# Patient Record
(demographics unavailable — no encounter records)

---

## 2025-03-03 NOTE — CONSULT LETTER
[Dear  ___] : Dear  [unfilled], [Consult Letter:] : I had the pleasure of evaluating your patient, [unfilled]. [Please see my note below.] : Please see my note below. [Consult Closing:] : Thank you very much for allowing me to participate in the care of this patient.  If you have any questions, please do not hesitate to contact me. [Sincerely,] : Sincerely, [FreeTextEntry3] : PAN Santana Certified Pediatric Nurse Practitioner  Pediatric Neurology  [facility]

## 2025-03-03 NOTE — HISTORY OF PRESENT ILLNESS
[FreeTextEntry1] : HILARIO is a 5 year old male here for initial evaluation of inattention.   Early development: HILARIO was born full term via NVD. he was discharged home with mother. he hit all early developmental milestones appropriately.  HILARIO attended day care program starting at 3 years old and then pre-school at age 4.     Educational assessment: Current Grade:  Current District: [employer].  General ED/Any Accommodations/ICT in place: Currently in a regular classroom with an IEP for speech and language. Approved for ST 2x/week, but finding a therapist has been difficult. OT services are pending approval. Although academically performing at grade level and completing classwork, teachers report significant hyperactivity and difficulty remaining seated, leading to disruptions and even falling out of their chair. These behaviors are not thought to be intentional. Mother reports HILARIO will be transferring to a public school for the next academic year due to the limited resources available in private schooling.   Home assessment: Lives at home w/ parents and 2yo sister. Normal sibling relationship..   Inattention: Very easily distracted, needing frequent redirection, difficulty with multi-step instructions Hyperactivity: Fidgety Impulsivity:  Used to be aggressive.  Can have occasional emotional outbursts, primarily at home. These outbursts, typically triggered by having a toy taken away, involve screaming, hitting, punching, and throwing objects. They can last for approximately one hour before HILARIO is able to self-regulate and calm down. Homework: HW completion is a struggle, requiring someone to sit with him throughout. Parents have implemented a reward system and are attempting to make homework more fun and interactive, which appears to be helping.  Social Concerns: Has friends, his mother reports concerns about his spatial awareness. Sleep: Trouble falling asleep. Afraid of the dark. comes to parents' bed in middle of the night. + restless sleep. occasional snoring. denies daytime fatigue/naps.  Eating: Parents very strict about his diet. No gluten.    Co- morbidities: No concern for depression, OCD Concern for anxiety exists due to the HILARIO preoccupation with death, particularly after the loss of the family dog. He expresses worry about his parents' potential absence. His mother acknowledges that he has recently learned about the concept of death. Other health concerns: Denies staring, twitching, seizure or seizure-like activity.   ACTIVITIES/INTERESTS: Does kickboxing and basketball. Can get emotional if  too aggressive.    FAMILY HISTORY: Family history of ADHD: + maternal aunt.  Family history of anxiety or depression: denies Denies family history of cardiac conditions or early unexplained deaths.

## 2025-03-03 NOTE — PLAN
[FreeTextEntry1] : - Mother to send copy of current IEP and most recent psychoeducational testing from school - Teacher [employer] forms reviewed. Advised mother to email copy of Parent/Teacher forms   - Letter for accommodations provided to parent. - Continue Omega 3 fish oil and magnesium supplements.  - Discussed potential benefits of behavioral therapy - Follow up 1 month to review [place] questionnaires as well as psychoeducational testing

## 2025-03-03 NOTE — PHYSICAL EXAM
[Well-appearing] : well-appearing [Normocephalic] : normocephalic [No dysmorphic facial features] : no dysmorphic facial features [No deformities] : no deformities [Alert] : alert [Well related, good eye contact] : well related, good eye contact [Conversant] : conversant [Normal speech and language] : normal speech and language [Follows instructions well] : follows instructions well [Full extraocular movements] : full extraocular movements [No nystagmus] : no nystagmus [Normal facial sensation to light touch] : normal facial sensation to light touch [No facial asymmetry or weakness] : no facial asymmetry or weakness [Gross hearing intact] : gross hearing intact [Equal palate elevation] : equal palate elevation [Good shoulder shrug] : good shoulder shrug [Normal tongue movement] : normal tongue movement [Normal axial and appendicular muscle tone] : normal axial and appendicular muscle tone [Gets up on table without difficulty] : gets up on table without difficulty [No pronator drift] : no pronator drift [No abnormal involuntary movements] : no abnormal involuntary movements [5/5 strength in proximal and distal muscles of arms and legs] : 5/5 strength in proximal and distal muscles of arms and legs [Walks and runs well] : walks and runs well [Good walking balance] : good walking balance [Normal gait] : normal gait [de-identified] : No respiratory distress noted.

## 2025-03-03 NOTE — ASSESSMENT
[FreeTextEntry1] : HILARIO is a 5-year-old male, presents for an initial evaluation of inattention and hyperactivity. He is currently in  at a private school, has an IEP for speech/language, and receives ST 2x/week. OT reports pending. While performing at grade level academically, his teachers report hyperactivity and difficulty remaining seated. He will be transferring to a public school for the next academic year. At home, there are concerns about inattention, requiring frequent redirection, and emotional outbursts. Teacher-reported [employer]ak forms (reviewed by phone) indicate inattention and hyperactivity. Non focal exam. Will plan to do workup to r/o ADHD using Zak forms and psychoeducational evaluation.